# Patient Record
(demographics unavailable — no encounter records)

---

## 2024-10-08 NOTE — PLAN
[TextEntry] : Continue 2% ketoconazole shampoo alternated with  Neutrogena T-Sal shampoo  Continue minocycline 100 mg p.o. once daily -can increase to twice a day if acne flares Continue 10% benzoyl peroxide wash in shower Continue tretinoin cream 0.05% apply sparingly to face at night  Return 3-month

## 2024-10-08 NOTE — HISTORY OF PRESENT ILLNESS
[FreeTextEntry1] : Acne [de-identified] : Follow-up visit for 15-year-old [place]  male last seen by me on July 29,, 2024, with a 1 year history of acne on the face and shoulders.  Treatment modified at the last visit to: Continue minocycline 100 mg p.o. once daily Continue 10% benzoyl peroxide wash in shower Continue tretinoin cream 0.05% apply sparingly to face at night  Note: Patient's father with history of severe acne and marked hypertrophic scarring on the trunk  Patient also has seborrheic dermatitis of the scalp. Treated with: Alternate 2% ketoconazole shampoo with Neutrogena T-sal shampoo every other day

## 2024-10-08 NOTE — ASSESSMENT
[FreeTextEntry1] : Seborrheic dermatitis in scalp-persistent Acne vulgaris on face-under reasonable control

## 2024-10-08 NOTE — PHYSICAL EXAM
[Alert] : alert [Oriented x 3] : ~L oriented x 3 [Well Nourished] : well nourished [FreeTextEntry3] : Scalp: Diffuse moderate scaling without underlying erythema  Face: Mild to moderate papules and pustules-especially cheeks and forehead-several are very small Mild to moderate comedones-especially cheeks and forehead Chest and back: Essentially clear